# Patient Record
Sex: FEMALE | Race: WHITE
[De-identification: names, ages, dates, MRNs, and addresses within clinical notes are randomized per-mention and may not be internally consistent; named-entity substitution may affect disease eponyms.]

---

## 2021-04-29 ENCOUNTER — HOSPITAL ENCOUNTER (EMERGENCY)
Dept: HOSPITAL 12 - ER | Age: 26
Discharge: HOME | End: 2021-04-29
Payer: COMMERCIAL

## 2021-04-29 VITALS — WEIGHT: 117 LBS | BODY MASS INDEX: 19.97 KG/M2 | HEIGHT: 64 IN

## 2021-04-29 DIAGNOSIS — R00.0: ICD-10-CM

## 2021-04-29 DIAGNOSIS — R05: ICD-10-CM

## 2021-04-29 DIAGNOSIS — Z20.822: ICD-10-CM

## 2021-04-29 DIAGNOSIS — R91.8: ICD-10-CM

## 2021-04-29 DIAGNOSIS — K21.9: ICD-10-CM

## 2021-04-29 DIAGNOSIS — R06.00: Primary | ICD-10-CM

## 2021-04-29 LAB
BASOPHILS # BLD AUTO: 0.1 K/UL (ref 0–8)
BASOPHILS NFR BLD AUTO: 0.3 % (ref 0–2)
BUN SERPL-MCNC: 10 MG/DL (ref 7–18)
CHLORIDE SERPL-SCNC: 100 MMOL/L (ref 98–107)
CO2 SERPL-SCNC: 26 MMOL/L (ref 21–32)
CREAT SERPL-MCNC: 0.8 MG/DL (ref 0.6–1.3)
EOSINOPHIL # BLD AUTO: 0.1 K/UL (ref 0–0.7)
EOSINOPHIL NFR BLD AUTO: 0.3 % (ref 0–7)
GLUCOSE SERPL-MCNC: 111 MG/DL (ref 74–106)
HCG UR QL: (no result)
HCT VFR BLD AUTO: 38.8 % (ref 31.2–41.9)
HGB BLD-MCNC: 12.8 G/DL (ref 10.9–14.3)
LYMPHOCYTES # BLD AUTO: 1.2 K/UL (ref 20–40)
LYMPHOCYTES NFR BLD AUTO: 4.9 % (ref 20.5–51.5)
MCH RBC QN AUTO: 27.5 UUG (ref 24.7–32.8)
MCHC RBC AUTO-ENTMCNC: 33 G/DL (ref 32.3–35.6)
MCV RBC AUTO: 83.3 FL (ref 75.5–95.3)
MONOCYTES # BLD AUTO: 0.7 K/UL (ref 2–10)
MONOCYTES NFR BLD AUTO: 3 % (ref 0–11)
NEUTROPHILS # BLD AUTO: 21.8 K/UL (ref 1.8–8.9)
NEUTROPHILS NFR BLD AUTO: 91.5 % (ref 38.5–71.5)
PLATELET # BLD AUTO: 261 K/UL (ref 179–408)
POTASSIUM SERPL-SCNC: 3.3 MMOL/L (ref 3.5–5.1)
RBC # BLD AUTO: 4.66 MIL/UL (ref 3.63–4.92)
WBC # BLD AUTO: 23.8 K/UL (ref 3.8–11.8)

## 2021-04-29 PROCEDURE — 85379 FIBRIN DEGRADATION QUANT: CPT

## 2021-04-29 PROCEDURE — 99285 EMERGENCY DEPT VISIT HI MDM: CPT

## 2021-04-29 PROCEDURE — 71260 CT THORAX DX C+: CPT

## 2021-04-29 PROCEDURE — 85025 COMPLETE CBC W/AUTO DIFF WBC: CPT

## 2021-04-29 PROCEDURE — 84703 CHORIONIC GONADOTROPIN ASSAY: CPT

## 2021-04-29 PROCEDURE — 87426 SARSCOV CORONAVIRUS AG IA: CPT

## 2021-04-29 PROCEDURE — A4663 DIALYSIS BLOOD PRESSURE CUFF: HCPCS

## 2021-04-29 PROCEDURE — 80048 BASIC METABOLIC PNL TOTAL CA: CPT

## 2021-04-29 PROCEDURE — 36415 COLL VENOUS BLD VENIPUNCTURE: CPT

## 2021-04-29 PROCEDURE — 71045 X-RAY EXAM CHEST 1 VIEW: CPT

## 2021-04-29 PROCEDURE — 93005 ELECTROCARDIOGRAM TRACING: CPT

## 2021-04-29 PROCEDURE — 84484 ASSAY OF TROPONIN QUANT: CPT

## 2021-04-29 NOTE — NUR
Still waiting for radiologist to read CT scan.  Sheri called radiology twice.  
Patient is awake and alert with no new complaints.  Denies SOB

## 2021-04-29 NOTE — NUR
DC AND FOLLOW UP INSTRUCTIONS GIVEN AND EXPLAINED TO PATIENT WHO STATES SHE 
UNDERSTANDS ALL INSTRUCTIONS.

## 2021-04-29 NOTE — NUR
Patient was seen by Dr Mejia.  12 lead EKG done, labs drawn.  Covid swab 
done and sent to lab.  Patient is awake and alert in no distress